# Patient Record
Sex: MALE | Race: WHITE
[De-identification: names, ages, dates, MRNs, and addresses within clinical notes are randomized per-mention and may not be internally consistent; named-entity substitution may affect disease eponyms.]

---

## 2017-01-11 ENCOUNTER — HOSPITAL ENCOUNTER (EMERGENCY)
Dept: HOSPITAL 17 - PHEFT | Age: 27
Discharge: HOME | End: 2017-01-11
Payer: SELF-PAY

## 2017-01-11 VITALS — BODY MASS INDEX: 32.1 KG/M2 | WEIGHT: 229.28 LBS | HEIGHT: 71 IN

## 2017-01-11 VITALS
RESPIRATION RATE: 16 BRPM | DIASTOLIC BLOOD PRESSURE: 80 MMHG | HEART RATE: 88 BPM | OXYGEN SATURATION: 98 % | TEMPERATURE: 98.8 F | SYSTOLIC BLOOD PRESSURE: 128 MMHG
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
## 2017-01-11 DIAGNOSIS — S10.93XA: Primary | ICD-10-CM

## 2017-01-11 PROCEDURE — 72040 X-RAY EXAM NECK SPINE 2-3 VW: CPT

## 2017-01-11 PROCEDURE — 99284 EMERGENCY DEPT VISIT MOD MDM: CPT
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
## 2017-08-04 ENCOUNTER — HOSPITAL ENCOUNTER (EMERGENCY)
Dept: HOSPITAL 17 - PHEFT | Age: 27
Discharge: HOME | End: 2017-08-04
Payer: SELF-PAY

## 2017-08-04 VITALS
TEMPERATURE: 98.1 F | DIASTOLIC BLOOD PRESSURE: 66 MMHG | SYSTOLIC BLOOD PRESSURE: 141 MMHG | OXYGEN SATURATION: 99 % | HEART RATE: 89 BPM | RESPIRATION RATE: 18 BRPM

## 2017-08-04 DIAGNOSIS — J06.9: Primary | ICD-10-CM

## 2017-08-04 DIAGNOSIS — Z87.39: ICD-10-CM

## 2017-08-04 DIAGNOSIS — Z86.79: ICD-10-CM

## 2017-08-04 DIAGNOSIS — Z87.09: ICD-10-CM

## 2017-08-04 PROCEDURE — 87880 STREP A ASSAY W/OPTIC: CPT

## 2017-08-04 PROCEDURE — 99283 EMERGENCY DEPT VISIT LOW MDM: CPT

## 2017-08-04 PROCEDURE — 87081 CULTURE SCREEN ONLY: CPT

## 2017-08-04 NOTE — PD
HPI


Chief Complaint:  ENT Complaint


Time Seen by Provider:  14:30


Travel History


International Travel<30 days:  No


Contact w/Intl Traveler<30days:  No


Traveled to known affect area:  No





History of Present Illness


HPI


27-year-old male with chief complaint of sore throat times one day.  Patient 

reports possible exposure to strep throat.  He reports some associated nasal 

congestion.  He denies fever, chills, nausea, vomiting, abdominal pain.  

Symptoms severity mild.  No aggravating or alleviating factors.





PFSH


Past Medical History


Asthma:  Yes


Heart Rhythm Problems:  Yes (HEART MURMUR)


Cardiovascular Problems:  Yes (HEART MURMUR)


Diminished Hearing:  No


Musculoskeletal:  Yes ("I HAVE BAD CARPEL TUNNEL")


Respiratory:  Yes (ASTHMA)


Immunizations Current:  No


Pneumonia:  Yes


Influenza Vaccination:  No





Past Surgical History


Ear Surgery:  Yes ("TUBES IN MY EARS AS A BABY")


Other Surgery:  Yes (ESOPHAGEAL)





Family History


Family Hypercholesterolemia:  Yes (MOTHER)





Social History


Alcohol Use:  Yes (RARE)


Tobacco Use:  No


Substance Use:  No





Allergies-Medications


(Allergen,Severity, Reaction):  


Coded Allergies:  


     Imitrex (Verified  Allergy, Severe, Anaphylaxis, 8/4/17)


     Singulair (Verified  Allergy, Unknown, VOMITING, 8/4/17)


Reported Meds & Prescriptions





Reported Meds & Active Scripts


Active


No Active Prescriptions or Reported Medications    








Review of Systems


Except as stated in HPI:  all other systems reviewed are Neg


General / Constitutional:  No: Fever


Eyes:  No: Visual changes


HENT:  Positive: Sore Throat,  No: Headaches





Physical Exam


Narrative


GENERAL: Well-nourished, well-developed patient.


SKIN: Focused skin assessment warm/dry.


HEAD: Normocephalic.


EYES: No scleral icterus. No injection or drainage. 


THROAT: Posterior oropharynx erythema with mild tonsillar swelling without 

exudate.


NECK: Supple, trachea midline. No JVD or lymphadenopathy.


CARDIOVASCULAR: Regular rate and rhythm without murmurs, gallops, or rubs. 


RESPIRATORY: Breath sounds equal bilaterally. No accessory muscle use.


GASTROINTESTINAL: Abdomen soft, non-tender, nondistended. 


MUSCULOSKELETAL: No cyanosis, or edema. 


BACK: Nontender without obvious deformity. No CVA tenderness.





Data


Data


Last Documented VS





Vital Signs








  Date Time  Temp Pulse Resp B/P Pulse Ox O2 Delivery O2 Flow Rate FiO2


 


8/4/17 14:08 98.1 89 18 141/66 99   








Orders





 Group A Rapid Strep Screen (8/4/17 14:59)


Strep Culture (Group A) (8/4/17 15:00)








MDM


Medical Decision Making


Medical Screen Exam Complete:  Yes


Emergency Medical Condition:  Yes


Differential Diagnosis


Viral Pharyngitis, strep pharyngitis, URI


Narrative Course


27-year-old male with chief complaint of sore throat times one day.  Possible 

strep exposure.  Patient's physical exam is reassuring.





Rapid strep screen negative





Patient will be treated for viral uri





Diagnosis





 Primary Impression:  


 URI (upper respiratory infection)


 Qualified Code:  J06.9 - Upper respiratory tract infection, unspecified type


Referrals:  


Primary Care Physician





***Additional Instructions:


Stay well hydrated by drinking plenty of fluids.


Take over-the-counter Motrin and/or Tylenol as needed for pain and fever.


Scripts


No Active Prescriptions or Reported Meds


Disposition:  01 DISCHARGE HOME


Condition:  Stable








Pepper Vela Aug 4, 2017 14:45

## 2018-06-21 ENCOUNTER — HOSPITAL ENCOUNTER (EMERGENCY)
Dept: HOSPITAL 17 - NEPD | Age: 28
Discharge: HOME | End: 2018-06-21
Payer: SELF-PAY

## 2018-06-21 VITALS — WEIGHT: 216.05 LBS | HEIGHT: 72 IN | BODY MASS INDEX: 29.26 KG/M2

## 2018-06-21 VITALS
OXYGEN SATURATION: 98 % | SYSTOLIC BLOOD PRESSURE: 167 MMHG | TEMPERATURE: 98.6 F | DIASTOLIC BLOOD PRESSURE: 80 MMHG | RESPIRATION RATE: 16 BRPM | HEART RATE: 92 BPM

## 2018-06-21 DIAGNOSIS — R31.9: ICD-10-CM
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
## 2018-06-21 DIAGNOSIS — J45.909: ICD-10-CM

## 2018-06-21 DIAGNOSIS — Z88.8: ICD-10-CM

## 2018-06-21 DIAGNOSIS — S37.30XA: Primary | ICD-10-CM

## 2018-06-21 LAB
GLUCOSE UR STRIP-MCNC: (no result) MG/DL
HGB UR QL STRIP: (no result)
KETONES UR STRIP-MCNC: (no result) MG/DL
NITRITE UR QL STRIP: (no result)
SP GR UR STRIP: 1.01 (ref 1–1.03)
URINE LEUKOCYTE ESTERASE: (no result)

## 2018-06-21 PROCEDURE — 81001 URINALYSIS AUTO W/SCOPE: CPT

## 2018-06-21 PROCEDURE — 99283 EMERGENCY DEPT VISIT LOW MDM: CPT
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
